# Patient Record
Sex: MALE | Race: OTHER | HISPANIC OR LATINO | URBAN - METROPOLITAN AREA
[De-identification: names, ages, dates, MRNs, and addresses within clinical notes are randomized per-mention and may not be internally consistent; named-entity substitution may affect disease eponyms.]

---

## 2019-01-06 ENCOUNTER — EMERGENCY (EMERGENCY)
Facility: HOSPITAL | Age: 24
LOS: 0 days | Discharge: HOME | End: 2019-01-06
Attending: EMERGENCY MEDICINE | Admitting: EMERGENCY MEDICINE
Payer: MEDICAID

## 2019-01-06 VITALS
RESPIRATION RATE: 18 BRPM | TEMPERATURE: 99 F | HEART RATE: 69 BPM | DIASTOLIC BLOOD PRESSURE: 86 MMHG | SYSTOLIC BLOOD PRESSURE: 118 MMHG | OXYGEN SATURATION: 96 %

## 2019-01-06 VITALS — TEMPERATURE: 98 F

## 2019-01-06 DIAGNOSIS — Z79.51 LONG TERM (CURRENT) USE OF INHALED STEROIDS: ICD-10-CM

## 2019-01-06 DIAGNOSIS — R05 COUGH: ICD-10-CM

## 2019-01-06 DIAGNOSIS — J45.909 UNSPECIFIED ASTHMA, UNCOMPLICATED: ICD-10-CM

## 2019-01-06 DIAGNOSIS — Z11.2 ENCOUNTER FOR SCREENING FOR OTHER BACTERIAL DISEASES: ICD-10-CM

## 2019-01-06 DIAGNOSIS — B34.9 VIRAL INFECTION, UNSPECIFIED: ICD-10-CM

## 2019-01-06 LAB — S PYO AG SPEC QL IA: NEGATIVE — SIGNIFICANT CHANGE UP

## 2019-01-06 PROCEDURE — 71046 X-RAY EXAM CHEST 2 VIEWS: CPT | Mod: 26

## 2019-01-06 PROCEDURE — 99053 MED SERV 10PM-8AM 24 HR FAC: CPT

## 2019-01-06 PROCEDURE — 99283 EMERGENCY DEPT VISIT LOW MDM: CPT | Mod: 25

## 2019-01-06 RX ORDER — IPRATROPIUM/ALBUTEROL SULFATE 18-103MCG
3 AEROSOL WITH ADAPTER (GRAM) INHALATION ONCE
Qty: 0 | Refills: 0 | Status: COMPLETED | OUTPATIENT
Start: 2019-01-06 | End: 2019-01-06

## 2019-01-06 RX ORDER — IBUPROFEN 200 MG
600 TABLET ORAL ONCE
Qty: 0 | Refills: 0 | Status: COMPLETED | OUTPATIENT
Start: 2019-01-06 | End: 2019-01-06

## 2019-01-06 RX ORDER — ALBUTEROL 90 UG/1
2 AEROSOL, METERED ORAL
Qty: 1 | Refills: 0 | OUTPATIENT
Start: 2019-01-06

## 2019-01-06 RX ADMIN — Medication 600 MILLIGRAM(S): at 02:06

## 2019-01-06 RX ADMIN — Medication 3 MILLILITER(S): at 02:06

## 2019-01-06 RX ADMIN — Medication 600 MILLIGRAM(S): at 03:17

## 2019-01-06 NOTE — ED PROVIDER NOTE - ENMT, MLM
Airway patent, Nasal mucosa clear. Mouth with normal mucosa. Throat has no vesicles, no oropharyngeal exudates and uvula is midline.  Mild erythema

## 2019-01-06 NOTE — ED PROVIDER NOTE - MEDICAL DECISION MAKING DETAILS
likely viral syndrome, probably flu, symptoms now for 5 days out of window for tamiflu.  plan check rapid strep, chest xray and give neb treatment.

## 2019-01-06 NOTE — ED PROVIDER NOTE - OBJECTIVE STATEMENT
int Chai [id number]    24 yo male with 5 days of subjective fever, cough, sore throat, body aches, headache.  Did not take his temperature but has had sweats and chills.  Took advil 5 hours pta.  Went to the dr today and was given cough medication, which he states isn't helping.  He has a h/o asthma and had medication in [facility], but doesn't have anything in [place].

## 2019-01-06 NOTE — ED ADULT NURSE REASSESSMENT NOTE - NS ED NURSE REASSESS COMMENT FT1
assumed pt care at 0215. Pt remains aox3, strep swabs sent to lab, pt in NAD. Family member at bedside. Duoneb in progress.

## 2019-01-06 NOTE — ED PROVIDER NOTE - NSFOLLOWUPINSTRUCTIONS_ED_ALL_ED_FT
Follow up at [facility] Monday  Tylenol 1 gram every 6 hours for fever  alternate with   Advil 600mg every 6 hour for fever  Drink plenty of fluids  Use albuterol inhaler 2 puffs every 6 hours  Hycodan 10ml at bedtime for cough  Return to Ed for any worsening symptoms

## 2023-03-30 ENCOUNTER — EMERGENCY (EMERGENCY)
Facility: HOSPITAL | Age: 28
LOS: 0 days | Discharge: HOME | End: 2023-03-31
Attending: EMERGENCY MEDICINE
Payer: COMMERCIAL

## 2023-03-30 VITALS
WEIGHT: 179.9 LBS | HEIGHT: 63 IN | SYSTOLIC BLOOD PRESSURE: 122 MMHG | DIASTOLIC BLOOD PRESSURE: 77 MMHG | OXYGEN SATURATION: 100 % | RESPIRATION RATE: 18 BRPM | TEMPERATURE: 97 F | HEART RATE: 75 BPM

## 2023-03-30 DIAGNOSIS — S39.011A STRAIN OF MUSCLE, FASCIA AND TENDON OF ABDOMEN, INITIAL ENCOUNTER: ICD-10-CM
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
## 2023-03-30 DIAGNOSIS — R10.32 LEFT LOWER QUADRANT PAIN: ICD-10-CM
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
## 2023-03-30 DIAGNOSIS — N50.3 CYST OF EPIDIDYMIS: ICD-10-CM

## 2023-03-30 PROCEDURE — 99285 EMERGENCY DEPT VISIT HI MDM: CPT

## 2023-03-30 PROCEDURE — 74176 CT ABD & PELVIS W/O CONTRAST: CPT | Mod: MA

## 2023-03-30 PROCEDURE — 99284 EMERGENCY DEPT VISIT MOD MDM: CPT | Mod: 25

## 2023-03-30 PROCEDURE — 76870 US EXAM SCROTUM: CPT

## 2023-03-30 PROCEDURE — 96374 THER/PROPH/DIAG INJ IV PUSH: CPT

## 2023-03-30 PROCEDURE — 81001 URINALYSIS AUTO W/SCOPE: CPT

## 2023-03-30 PROCEDURE — 87086 URINE CULTURE/COLONY COUNT: CPT

## 2023-03-30 PROCEDURE — 74176 CT ABD & PELVIS W/O CONTRAST: CPT | Mod: 26,MA

## 2023-03-30 PROCEDURE — 36415 COLL VENOUS BLD VENIPUNCTURE: CPT

## 2023-03-30 PROCEDURE — 85025 COMPLETE CBC W/AUTO DIFF WBC: CPT

## 2023-03-30 PROCEDURE — 80053 COMPREHEN METABOLIC PANEL: CPT

## 2023-03-30 RX ORDER — KETOROLAC TROMETHAMINE 30 MG/ML
30 SYRINGE (ML) INJECTION ONCE
Refills: 0 | Status: DISCONTINUED | OUTPATIENT
Start: 2023-03-30 | End: 2023-03-30

## 2023-03-30 RX ADMIN — Medication 30 MILLIGRAM(S): at 23:57

## 2023-03-30 NOTE — ED ADULT TRIAGE NOTE - CHIEF COMPLAINT QUOTE
LLQ abdominal pain radiating to left groin since this morning with left testicular discomfort and difficulty urinating

## 2023-03-31 VITALS
HEART RATE: 77 BPM | SYSTOLIC BLOOD PRESSURE: 117 MMHG | DIASTOLIC BLOOD PRESSURE: 80 MMHG | OXYGEN SATURATION: 100 % | TEMPERATURE: 98 F | RESPIRATION RATE: 17 BRPM

## 2023-03-31 PROBLEM — J45.909 UNSPECIFIED ASTHMA, UNCOMPLICATED: Chronic | Status: ACTIVE | Noted: 2019-01-06

## 2023-03-31 LAB
ALBUMIN SERPL ELPH-MCNC: 4.1 G/DL — SIGNIFICANT CHANGE UP (ref 3.3–5)
ALP SERPL-CCNC: 76 U/L — SIGNIFICANT CHANGE UP (ref 40–120)
ALT FLD-CCNC: 42 U/L — SIGNIFICANT CHANGE UP (ref 12–78)
ANION GAP SERPL CALC-SCNC: 2 MMOL/L — LOW (ref 5–17)
APPEARANCE UR: CLEAR — SIGNIFICANT CHANGE UP
AST SERPL-CCNC: 23 U/L — SIGNIFICANT CHANGE UP (ref 15–37)
BACTERIA # UR AUTO: NEGATIVE — SIGNIFICANT CHANGE UP
BASOPHILS # BLD AUTO: 0.04 K/UL — SIGNIFICANT CHANGE UP (ref 0–0.2)
BASOPHILS NFR BLD AUTO: 0.5 % — SIGNIFICANT CHANGE UP (ref 0–2)
BILIRUB SERPL-MCNC: 0.6 MG/DL — SIGNIFICANT CHANGE UP (ref 0.2–1.2)
BILIRUB UR-MCNC: NEGATIVE — SIGNIFICANT CHANGE UP
BUN SERPL-MCNC: 12 MG/DL — SIGNIFICANT CHANGE UP (ref 7–23)
CALCIUM SERPL-MCNC: 8.7 MG/DL — SIGNIFICANT CHANGE UP (ref 8.5–10.1)
CHLORIDE SERPL-SCNC: 111 MMOL/L — HIGH (ref 96–108)
CO2 SERPL-SCNC: 28 MMOL/L — SIGNIFICANT CHANGE UP (ref 22–31)
COLOR SPEC: YELLOW — SIGNIFICANT CHANGE UP
CREAT SERPL-MCNC: 0.88 MG/DL — SIGNIFICANT CHANGE UP (ref 0.5–1.3)
DIFF PNL FLD: NEGATIVE — SIGNIFICANT CHANGE UP
EGFR: 119 ML/MIN/1.73M2 — SIGNIFICANT CHANGE UP
EOSINOPHIL # BLD AUTO: 0.18 K/UL — SIGNIFICANT CHANGE UP (ref 0–0.5)
EOSINOPHIL NFR BLD AUTO: 2.2 % — SIGNIFICANT CHANGE UP (ref 0–6)
EPI CELLS # UR: NEGATIVE — SIGNIFICANT CHANGE UP
GLUCOSE SERPL-MCNC: 124 MG/DL — HIGH (ref 70–99)
GLUCOSE UR QL: NEGATIVE — SIGNIFICANT CHANGE UP
HCT VFR BLD CALC: 41.5 % — SIGNIFICANT CHANGE UP (ref 39–50)
HGB BLD-MCNC: 14.4 G/DL — SIGNIFICANT CHANGE UP (ref 13–17)
IMM GRANULOCYTES NFR BLD AUTO: 0.5 % — SIGNIFICANT CHANGE UP (ref 0–0.9)
KETONES UR-MCNC: ABNORMAL
LEUKOCYTE ESTERASE UR-ACNC: ABNORMAL
LYMPHOCYTES # BLD AUTO: 2.53 K/UL — SIGNIFICANT CHANGE UP (ref 1–3.3)
LYMPHOCYTES # BLD AUTO: 31.1 % — SIGNIFICANT CHANGE UP (ref 13–44)
MCHC RBC-ENTMCNC: 29.4 PG — SIGNIFICANT CHANGE UP (ref 27–34)
MCHC RBC-ENTMCNC: 34.7 GM/DL — SIGNIFICANT CHANGE UP (ref 32–36)
MCV RBC AUTO: 84.9 FL — SIGNIFICANT CHANGE UP (ref 80–100)
MONOCYTES # BLD AUTO: 0.49 K/UL — SIGNIFICANT CHANGE UP (ref 0–0.9)
MONOCYTES NFR BLD AUTO: 6 % — SIGNIFICANT CHANGE UP (ref 2–14)
NEUTROPHILS # BLD AUTO: 4.85 K/UL — SIGNIFICANT CHANGE UP (ref 1.8–7.4)
NEUTROPHILS NFR BLD AUTO: 59.7 % — SIGNIFICANT CHANGE UP (ref 43–77)
NITRITE UR-MCNC: NEGATIVE — SIGNIFICANT CHANGE UP
PH UR: 5 — SIGNIFICANT CHANGE UP (ref 5–8)
PLATELET # BLD AUTO: 237 K/UL — SIGNIFICANT CHANGE UP (ref 150–400)
POTASSIUM SERPL-MCNC: 3.8 MMOL/L — SIGNIFICANT CHANGE UP (ref 3.5–5.3)
POTASSIUM SERPL-SCNC: 3.8 MMOL/L — SIGNIFICANT CHANGE UP (ref 3.5–5.3)
PROT SERPL-MCNC: 7.8 GM/DL — SIGNIFICANT CHANGE UP (ref 6–8.3)
PROT UR-MCNC: NEGATIVE — SIGNIFICANT CHANGE UP
RBC # BLD: 4.89 M/UL — SIGNIFICANT CHANGE UP (ref 4.2–5.8)
RBC # FLD: 11.6 % — SIGNIFICANT CHANGE UP (ref 10.3–14.5)
RBC CASTS # UR COMP ASSIST: SIGNIFICANT CHANGE UP /HPF (ref 0–4)
SODIUM SERPL-SCNC: 141 MMOL/L — SIGNIFICANT CHANGE UP (ref 135–145)
SP GR SPEC: 1.02 — SIGNIFICANT CHANGE UP (ref 1.01–1.02)
UROBILINOGEN FLD QL: 1
WBC # BLD: 8.13 K/UL — SIGNIFICANT CHANGE UP (ref 3.8–10.5)
WBC # FLD AUTO: 8.13 K/UL — SIGNIFICANT CHANGE UP (ref 3.8–10.5)
WBC UR QL: SIGNIFICANT CHANGE UP /HPF (ref 0–5)

## 2023-03-31 PROCEDURE — 76870 US EXAM SCROTUM: CPT | Mod: 26

## 2023-03-31 NOTE — ED PROVIDER NOTE - NSFOLLOWUPINSTRUCTIONS_ED_ALL_ED_FT
Take ibuprofen for pain.  Take 600mg every 6 hours as needed.    See your primary care doctor within 1-2 weeks.

## 2023-03-31 NOTE — ED PROVIDER NOTE - NSFOLLOWUPCLINICS_GEN_ALL_ED_FT
[facility]  Family Medicine  [address] [place] [address]  Phone: [phone]  Fax:

## 2023-03-31 NOTE — ED PROVIDER NOTE - PATIENT PORTAL LINK FT
You can access the FollowMyHealth Patient Portal offered by [facility] by registering at the following website: [email or web address]. By joining [facility]’s FollowMyHealth portal, you will also be able to view your health information using other applications (apps) compatible with our system.

## 2023-03-31 NOTE — ED PROVIDER NOTE - HEME LYMPH, MLM
No adenopathy or splenomegaly. No cervical or inguinal lymphadenopathy. Yes - the patient is able to be screened

## 2023-03-31 NOTE — ED PROVIDER NOTE - OBJECTIVE STATEMENT
Pt. is a 26 yo M with PMHx of asthma presenting with abdominal pain X 1 day.  Patient describes 1 day of intermittent LLQ abdominal pain radiating into groin and testicle.  Denies dysuria or penile discharge.  No hematuria.  No hx of kidney stones.  Denies fever, cough, chest pain, vomiting or diarrhea.  Noticed some urinary hesitancy in the past few hours but able to urinate.

## 2023-04-01 LAB
CULTURE RESULTS: SIGNIFICANT CHANGE UP
SPECIMEN SOURCE: SIGNIFICANT CHANGE UP

## 2023-04-23 ENCOUNTER — EMERGENCY (EMERGENCY)
Facility: HOSPITAL | Age: 28
LOS: 0 days | Discharge: HOME | End: 2023-04-23
Attending: STUDENT IN AN ORGANIZED HEALTH CARE EDUCATION/TRAINING PROGRAM
Payer: COMMERCIAL

## 2023-04-23 VITALS
HEART RATE: 59 BPM | OXYGEN SATURATION: 99 % | DIASTOLIC BLOOD PRESSURE: 69 MMHG | SYSTOLIC BLOOD PRESSURE: 118 MMHG | RESPIRATION RATE: 17 BRPM | TEMPERATURE: 98 F

## 2023-04-23 VITALS
HEART RATE: 60 BPM | WEIGHT: 169.98 LBS | HEIGHT: 67 IN | SYSTOLIC BLOOD PRESSURE: 138 MMHG | DIASTOLIC BLOOD PRESSURE: 66 MMHG | RESPIRATION RATE: 18 BRPM | OXYGEN SATURATION: 98 % | TEMPERATURE: 98 F

## 2023-04-23 DIAGNOSIS — R55 SYNCOPE AND COLLAPSE: ICD-10-CM

## 2023-04-23 DIAGNOSIS — R22.2 LOCALIZED SWELLING, MASS AND LUMP, TRUNK: ICD-10-CM

## 2023-04-23 DIAGNOSIS — Z91.040 LATEX ALLERGY STATUS: ICD-10-CM

## 2023-04-23 DIAGNOSIS — J45.909 UNSPECIFIED ASTHMA, UNCOMPLICATED: ICD-10-CM

## 2023-04-23 DIAGNOSIS — R06.02 SHORTNESS OF BREATH: ICD-10-CM

## 2023-04-23 DIAGNOSIS — R42 DIZZINESS AND GIDDINESS: ICD-10-CM

## 2023-04-23 LAB
ALBUMIN SERPL ELPH-MCNC: 3.7 G/DL — SIGNIFICANT CHANGE UP (ref 3.3–5)
ALP SERPL-CCNC: 68 U/L — SIGNIFICANT CHANGE UP (ref 40–120)
ALT FLD-CCNC: 27 U/L — SIGNIFICANT CHANGE UP (ref 12–78)
ANION GAP SERPL CALC-SCNC: 2 MMOL/L — LOW (ref 5–17)
AST SERPL-CCNC: 13 U/L — LOW (ref 15–37)
BASOPHILS # BLD AUTO: 0.02 K/UL — SIGNIFICANT CHANGE UP (ref 0–0.2)
BASOPHILS NFR BLD AUTO: 0.2 % — SIGNIFICANT CHANGE UP (ref 0–2)
BILIRUB SERPL-MCNC: 1.5 MG/DL — HIGH (ref 0.2–1.2)
BUN SERPL-MCNC: 14 MG/DL — SIGNIFICANT CHANGE UP (ref 7–23)
CALCIUM SERPL-MCNC: 9 MG/DL — SIGNIFICANT CHANGE UP (ref 8.5–10.1)
CHLORIDE SERPL-SCNC: 107 MMOL/L — SIGNIFICANT CHANGE UP (ref 96–108)
CO2 SERPL-SCNC: 29 MMOL/L — SIGNIFICANT CHANGE UP (ref 22–31)
CREAT SERPL-MCNC: 0.98 MG/DL — SIGNIFICANT CHANGE UP (ref 0.5–1.3)
D DIMER BLD IA.RAPID-MCNC: <150 NG/ML DDU — SIGNIFICANT CHANGE UP
EGFR: 107 ML/MIN/1.73M2 — SIGNIFICANT CHANGE UP
EOSINOPHIL # BLD AUTO: 0.08 K/UL — SIGNIFICANT CHANGE UP (ref 0–0.5)
EOSINOPHIL NFR BLD AUTO: 0.8 % — SIGNIFICANT CHANGE UP (ref 0–6)
GLUCOSE SERPL-MCNC: 145 MG/DL — HIGH (ref 70–99)
HCT VFR BLD CALC: 40.9 % — SIGNIFICANT CHANGE UP (ref 39–50)
HGB BLD-MCNC: 13.8 G/DL — SIGNIFICANT CHANGE UP (ref 13–17)
IMM GRANULOCYTES NFR BLD AUTO: 0.3 % — SIGNIFICANT CHANGE UP (ref 0–0.9)
LIDOCAIN IGE QN: 117 U/L — SIGNIFICANT CHANGE UP (ref 73–393)
LYMPHOCYTES # BLD AUTO: 3.1 K/UL — SIGNIFICANT CHANGE UP (ref 1–3.3)
LYMPHOCYTES # BLD AUTO: 32.6 % — SIGNIFICANT CHANGE UP (ref 13–44)
MAGNESIUM SERPL-MCNC: 1.9 MG/DL — SIGNIFICANT CHANGE UP (ref 1.6–2.6)
MCHC RBC-ENTMCNC: 29.3 PG — SIGNIFICANT CHANGE UP (ref 27–34)
MCHC RBC-ENTMCNC: 33.7 GM/DL — SIGNIFICANT CHANGE UP (ref 32–36)
MCV RBC AUTO: 86.8 FL — SIGNIFICANT CHANGE UP (ref 80–100)
MONOCYTES # BLD AUTO: 0.65 K/UL — SIGNIFICANT CHANGE UP (ref 0–0.9)
MONOCYTES NFR BLD AUTO: 6.8 % — SIGNIFICANT CHANGE UP (ref 2–14)
NEUTROPHILS # BLD AUTO: 5.63 K/UL — SIGNIFICANT CHANGE UP (ref 1.8–7.4)
NEUTROPHILS NFR BLD AUTO: 59.3 % — SIGNIFICANT CHANGE UP (ref 43–77)
NT-PROBNP SERPL-SCNC: 83 PG/ML — SIGNIFICANT CHANGE UP (ref 0–125)
PLATELET # BLD AUTO: 249 K/UL — SIGNIFICANT CHANGE UP (ref 150–400)
POTASSIUM SERPL-MCNC: 3.8 MMOL/L — SIGNIFICANT CHANGE UP (ref 3.5–5.3)
POTASSIUM SERPL-SCNC: 3.8 MMOL/L — SIGNIFICANT CHANGE UP (ref 3.5–5.3)
PROT SERPL-MCNC: 7.5 GM/DL — SIGNIFICANT CHANGE UP (ref 6–8.3)
RBC # BLD: 4.71 M/UL — SIGNIFICANT CHANGE UP (ref 4.2–5.8)
RBC # FLD: 11.5 % — SIGNIFICANT CHANGE UP (ref 10.3–14.5)
SODIUM SERPL-SCNC: 138 MMOL/L — SIGNIFICANT CHANGE UP (ref 135–145)
TROPONIN I, HIGH SENSITIVITY RESULT: 3.35 NG/L — SIGNIFICANT CHANGE UP
WBC # BLD: 9.51 K/UL — SIGNIFICANT CHANGE UP (ref 3.8–10.5)
WBC # FLD AUTO: 9.51 K/UL — SIGNIFICANT CHANGE UP (ref 3.8–10.5)

## 2023-04-23 PROCEDURE — 85379 FIBRIN DEGRADATION QUANT: CPT

## 2023-04-23 PROCEDURE — 36415 COLL VENOUS BLD VENIPUNCTURE: CPT

## 2023-04-23 PROCEDURE — 71045 X-RAY EXAM CHEST 1 VIEW: CPT | Mod: 26

## 2023-04-23 PROCEDURE — 83880 ASSAY OF NATRIURETIC PEPTIDE: CPT

## 2023-04-23 PROCEDURE — 93005 ELECTROCARDIOGRAM TRACING: CPT

## 2023-04-23 PROCEDURE — 93010 ELECTROCARDIOGRAM REPORT: CPT

## 2023-04-23 PROCEDURE — 84484 ASSAY OF TROPONIN QUANT: CPT

## 2023-04-23 PROCEDURE — 82962 GLUCOSE BLOOD TEST: CPT

## 2023-04-23 PROCEDURE — 80053 COMPREHEN METABOLIC PANEL: CPT

## 2023-04-23 PROCEDURE — 99285 EMERGENCY DEPT VISIT HI MDM: CPT

## 2023-04-23 PROCEDURE — 71045 X-RAY EXAM CHEST 1 VIEW: CPT

## 2023-04-23 PROCEDURE — 83690 ASSAY OF LIPASE: CPT

## 2023-04-23 PROCEDURE — 83735 ASSAY OF MAGNESIUM: CPT

## 2023-04-23 PROCEDURE — 85025 COMPLETE CBC W/AUTO DIFF WBC: CPT

## 2023-04-23 PROCEDURE — 99285 EMERGENCY DEPT VISIT HI MDM: CPT | Mod: 25

## 2023-04-23 RX ORDER — SODIUM CHLORIDE 9 MG/ML
2000 INJECTION INTRAMUSCULAR; INTRAVENOUS; SUBCUTANEOUS ONCE
Refills: 0 | Status: COMPLETED | OUTPATIENT
Start: 2023-04-23 | End: 2023-04-23

## 2023-04-23 RX ADMIN — SODIUM CHLORIDE 2000 MILLILITER(S): 9 INJECTION INTRAMUSCULAR; INTRAVENOUS; SUBCUTANEOUS at 22:11

## 2023-04-23 NOTE — ED PROVIDER NOTE - OBJECTIVE STATEMENT
28 y/o male with PMHx of asthma presents to the ED s/p near-syncopal episode. Pt had surgery for his hernia near left groin on Friday 4/21. Today pt was sitting and talking with family when he suddenly felt dizzy and lightheaded and became pale and clammy. Aunt states that pt had 2 minute period of confusion as well. No LOC. Pt states that he currently has inflammation at surgical site. Denies CP, difficulty breathing, and calf pain. Slight SOB. Pt has not had a BM since Thursday but is still able to pass gas.  Currently taking percocet ever 6 hours. Nonsmoker, no EtOH.  Allergic to latex.

## 2023-04-23 NOTE — ED ADULT NURSE REASSESSMENT NOTE - NS ED NURSE REASSESS COMMENT FT1
Order obtained from Dr. Friedman for 2,000 ml IV fluids 0.9% normal saline over one hour. Order implemented.
Pt care assumed from previous RN, Thor. Pt is resting in stretcher at this time w/ no complaints. Family is at bedside and aware of POC to await test results.

## 2023-04-23 NOTE — ED ADULT TRIAGE NOTE - TEMPERATURE IN FAHRENHEIT (DEGREES F)
Patient states that her insurance will not pay for the vials? What are her other options? Please advise and send a new prescription to [facility]. Thanks! 97.6

## 2023-04-23 NOTE — ED PROVIDER NOTE - GASTROINTESTINAL, MLM
Abdomen soft, non-tender, no guarding. left inguinal surgical site clean dry intact no signs of infection no erythema no palpable abscess, minimal ttp

## 2023-04-23 NOTE — ED ADULT TRIAGE NOTE - CHIEF COMPLAINT QUOTE
Pt presents to ED BIBA for near syncope today while walking around at home. Pt had hernia surgery 2 days ago, pt has been resting on the couch all day, his family encouraged him to walk around inside the house, he got up and walked for a few minutes, became dizzy and sweaty so his family called 911, pt did not LOC.  Pt denies nausea/vomiting, chest pain, SOB. Pt's family states that he has not been eating since the surgery. , EKG in process.

## 2023-04-23 NOTE — ED PROVIDER NOTE - CLINICAL SUMMARY MEDICAL DECISION MAKING FREE TEXT BOX
Adult male post op day 2 from left inguinal hernia repair comes in with near syncopal episode. Vitals are normal. Pt is fatigued, tired c/o pain at incision site. Still passing gas from below. Abdominal exam benign. Plan is for blood work, D-dimer, IV fluids. EKG looks normal. If blood work is negative, can be discharged with outpatient followup. Near-syncope can be due to dehydration.

## 2023-04-23 NOTE — ED ADULT NURSE NOTE - OBJECTIVE STATEMENT
Pt is A&O x 3, appears cool and clammy, states he had a near syncope episode, feels dizzy and nausea, Pt states he had a hernia repair a couple of days ago. Post surgical incision left groin linear In appears approx. 6 cm. PT states having mild pain at the site. PT has been taking percocet for pain. PT states not having a bowel movement since before the surgery and has not been eating or drinking a lot post op. Family at bedside (Aunt and cousin) states patient had a period of confusion lasting over a couple of minutes. PT placed on [employer] tele monitor.

## 2023-04-23 NOTE — ED PROVIDER NOTE - NSFOLLOWUPINSTRUCTIONS_ED_ALL_ED_FT
Seek immediate medical assistance for any new or worsening symptoms. If you have issues obtaining follow up, please call: [phone] or [phone]  to obtain a doctor or specialist who takes your insurance in your area.     Follow up with your general surgeon.     [facility]  Cardiology  [address] [place] [address]  Phone: [phone]  Fax:   Follow Up Time:     Near Syncope    WHAT YOU NEED TO KNOW:    Near syncope, also called presyncope, is the feeling that you may faint (lose consciousness), but you do not. You can control some health conditions that cause near syncope. Your healthcare providers can help you create a plan to manage near syncope and prevent episodes.    DISCHARGE INSTRUCTIONS:    Seek care immediately if:    You have sudden chest pain.    You have trouble breathing or shortness of breath.    You have vision changes, are sweating, and have nausea while you are sitting or lying down.    You feel dizzy or flushed and your heart is fluttering.    You lose consciousness.    You cannot use your arm, hand, foot, or leg, or it feels weak.    You have trouble speaking or understanding others when they speak.  Contact your healthcare provider if:    You have new or worsening symptoms.    Your heart beats faster or slower than usual.    You have questions or concerns about your condition or care.  Medicines:    Medicines may be needed to help your heart pump strongly and regularly. Your healthcare provider may also make changes to any medicines that are causing syncope.    Take your medicine as directed. Contact your healthcare provider if you think your medicine is not helping or if you have side effects. Tell him or her if you are allergic to any medicine. Keep a list of the medicines, vitamins, and herbs you take. Include the amounts, and when and why you take them. Bring the list or the pill bottles to follow-up visits. Carry your medicine list with you in case of an emergency.  Follow up with your healthcare provider as directed: You may need more tests to help find the cause of your near syncope episodes. The tests will help healthcare providers plan the best treatment for you. Write down your questions so you remember to ask them during your visits.    Manage near syncope:    Sit or lie down when needed. This includes when you feel dizzy, your throat is getting tight, and your vision changes. Raise your legs above the level of your heart.    Take slow, deep breaths if you start to breathe faster with anxiety or fear. This can help decrease dizziness and the feeling that you might faint.    Keep a record of your near syncope episodes. Include your symptoms and your activity before and after the episode. The record can help your healthcare provider find the cause of your near syncope and help you manage episodes.  Prevent a near syncope episode:    Move slowly and let yourself get used to one position before you move to another position. This is very important when you change from a lying or sitting position to a standing position. Take some deep breaths before you stand up from a lying position. Stand up slowly. Sudden movements may cause a fainting spell. Sit on the side of the bed or couch for a few minutes before you stand up. If you are on bedrest, try to be upright for about 2 hours each day, or as directed. Do not lock your legs if you are standing for a long period of time. Move your legs and bend your knees to keep blood flowing.    Follow your healthcare provider's recommendations. Your provider may recommend that you drink more liquids to prevent dehydration. You may also need to have more salt to keep your blood pressure from dropping too low and causing syncope. Your provider will tell you how much liquid and sodium to have each day. He or she will also tell you how much physical activity is safe for you. This will depend on what is causing your near syncope.    Watch for signs of low blood sugar. These include hunger, nervousness, sweating, and fast or fluttery heartbeats. Talk with your healthcare provider about ways to keep your blood sugar level steady.    Check your blood pressure often. This is important if you take medicine to lower your blood pressure. Check your blood pressure when you are lying down and when you are standing. Ask how often to check during the day. Keep a record of your blood pressure numbers. Your healthcare provider may use the record to help plan your treatment.  How to take a Blood Pressure      Do not strain if you are constipated. You may faint if you strain to have a bowel movement. Walking is the best way to get your bowels moving. Eat foods high in fiber to make it easier to have a bowel movement. Good examples are high-fiber cereals, beans, vegetables, and whole-grain breads. Prune juice may help make bowel movements softer.    Presíncope    LO QUE NECESITA SABER:    Un presíncope es la sensación de que usted se puede desmayar (perder el conocimiento), natalie no lo hace. Usted puede controlar ciertas afecciones médicas que provocan presíncope. Adelaida médicos pueden ayudarlo a crear un plan para controlar el presíncope y evitar la ocurrencia de episodios.    INSTRUCCIONES SOBRE EL EROS HOSPITALARIA:    Busque atención médica de inmediato si:    Tiene dolor repentino en el pecho.    Usted tiene dificultad para respirar o le falta el aire.    Usted tiene cambios en la visión, está sudando y tiene náuseas mientras está sentado o acostado.    Usted se siente mareado o acalorado y worley corazón le está aleteando telma si se le fuera a salir.    Usted pierde el conocimiento.    Usted no puede controlar un brazo, corby mano, un pie o corby pierna, o los siente débiles.    Tiene problemas para hablar o entender a otros cuando hablan.  Comuníquese con worley médico si:    Usted tiene nuevos síntomas o adelaida síntomas empeoran.    Worley corazón late más rápido o más despacio que lo acostumbrado.    Usted tiene preguntas o inquietudes acerca de worley condición o cuidado.  Medicamentos:    Los medicamentospodrían administrarse para ayudar a que worley corazón flavia con fuerza y regularidad. Worley médico podría hacer cambios a cualquier medicamento que le esté causando síncope.    [place] adelaida medicamentos telma se le haya indicado.Consulte con worley médico si usted sarwat que worley medicamento no le está ayudando o si presenta efectos secundarios. Infórmele si es alérgico a cualquier medicamento. Mantenga corby lista actualizada de los medicamentos, las vitaminas y los productos herbales que angelia. Incluya los siguientes datos de los medicamentos: cantidad, frecuencia y motivo de administración. Traiga con usted la lista o los envases de las píldoras a adelaida citas de seguimiento. Lleve la lista de los medicamentos con usted en chandrika de corby emergencia.  Acuda a adelaida consultas de control con worley médico según le indicaron.Usted podría necesitar más exámenes para averiguar la causa de adelaida episodios de presíncope. Los exámenes ayudarán a worley médico a planificar el mejor tratamiento para usted. [place] adelaida preguntas para que se acuerde de hacerlas luis fernando adelaida visitas.    Control del presíncope:    Siéntese o acuéstese cuando sea necesario.[place] incluye cuando se sienta mareado, worley garganta se cierre o note cambios en worley visión. Eleve adelaida piernas por encima del nivel de worley corazón.    Inhale lenta y profundamente si comienza a respirar más rápido a causa de la ansiedad o el temor.[place] puede disminuir el mareo y la sensación de que se va a desmayar.    Mantenga un registro de los episodios de presíncope.Incluya los síntomas y la actividad que realiza antes y después del episodio. El registro puede ayudar a worley médico a determinar la causa de worley presíncope y ayudarlo a controlar los episodios.  Prevención de un episodio de presíncope:    Muévase lentamente y acostúmbrese a corby posición antes de moverse a otra.[place] es muy importante cuando usted se cambie de corby posición acostada o sentada a corby posición de pie. Respire profundo varias veces antes de ponerse de pie después de vic estado acostado. Póngase de pie lentamente. Los movimientos repentinos podrían causar desmayos. Siéntese en el borde de la cama o del sofá por unos minutos antes de ponerse de pie. En chandrika que esté guardando cama, debe tratar de estar en corby posición vertical por lo menos por 2 horas todos los días o telma se lo indicaron. No inmovilice las piernas cuando esté de pie luis fernando un tito periodo de tiempo. Mueva las piernas y doble las rodillas para mantener el flujo de monica.    Siga las recomendaciones de worley médico.El médico puede recomendarle que ingiera más líquidos para evitar la deshidratación. Es probable que usted también deba aumentar worley consumo de sal para evitar que worley presión arterial baje demasiado y ocurra un síncope. El médico le dirá cuánto líquido y sodio debe consumir cada día. También le dirá cuánta actividad física es ackerman para usted. [place] dependerá de la causa de worley presíncope.    Esté atento a los signos de bajo nivel de azúcar en la monica.Los signos incluyen hambre, nerviosismo, sudoración y latidos cardíacos rápidos o palpitantes. Consulte con worley médico sobre métodos para mantener estable worley nivel de azúcar en la monica.    Revise worley presión arterial con frecuencia.[place] es importante si usted angelia medicamentos para bajar la presión arterial. Revise worley presión arterial cuando esté acostado y cuando esté de pie. Pregunte con que frecuencia debe tomarse la presión luis fernando el día. Mantenga un registro de los valores numéricos de worley presión arterial. Worley médico puede usar la información del registro telma corby base para planificar worley tratamiento.  Cómo noah la presión arterial      No se esfuerce si está estreñido.Puede desmayarse si usted hace fuerza para realizar corby evacuación intestinal. Caminar es lo mejor que usted puede hacer para que adelaida intestinos se muevan. Consuma alimentos ricos en fibra para facilitar las evacuaciones intestinales. Los cereales altos en fibra, los frijoles, las verduras y los panes integrales son buenos ejemplos. El jugo de ciruelas pasas también puede suavizar las evacuaciones intestinales.    No oneil ejercicio al aire angel luis en un día de calor.La combinación de calor y actividad física puede llevar a la deshidratación. [place] podría causar un síncope.    Do not exercise outside on a hot day. The combination of physical activity and heat can lead to dehydration. This can cause syncope.

## 2023-04-23 NOTE — ED PROVIDER NOTE - PATIENT PORTAL LINK FT
You can access the FollowMyHealth Patient Portal offered by [facility] by registering at the following website: [email or web address]. By joining [facility]’s FollowMyHealth portal, you will also be able to view your health information using other applications (apps) compatible with our system.

## 2023-06-14 NOTE — ED PROVIDER NOTE - WET READ LAUNCH FT
Subjective   Patient ID: Jaylen Jung is a 23 y.o. male.    HPI  Right hand ganglion cyst, tried to hit with bible and it went awy and then came back and pain is worse. Right hand dominant.   Review of Systems   Constitutional:  Positive for unexpected weight change (40 pound weight loss).     Wrist cyst,   Objective   Physical Exam  Vitals reviewed.   Constitutional:       Appearance: Normal appearance.   HENT:      Head: Normocephalic and atraumatic.      Right Ear: Tympanic membrane normal.      Left Ear: Tympanic membrane normal.      Nose: Nose normal.      Mouth/Throat:      Mouth: Mucous membranes are moist.      Pharynx: Oropharynx is clear.   Eyes:      Extraocular Movements: Extraocular movements intact.      Conjunctiva/sclera: Conjunctivae normal.      Pupils: Pupils are equal, round, and reactive to light.   Cardiovascular:      Rate and Rhythm: Normal rate and regular rhythm.      Pulses: Normal pulses.      Heart sounds: Normal heart sounds.   Pulmonary:      Effort: Pulmonary effort is normal.      Breath sounds: Normal breath sounds.   Abdominal:      General: Abdomen is flat. Bowel sounds are normal.      Palpations: Abdomen is soft.   Musculoskeletal:         General: Swelling and deformity present. Normal range of motion.      Cervical back: Normal range of motion and neck supple.      Comments: Ganglion cyst present right wrist.      Skin:     General: Skin is warm and dry.      Capillary Refill: Capillary refill takes less than 2 seconds.   Neurological:      General: No focal deficit present.      Mental Status: He is alert and oriented to person, place, and time.   Psychiatric:         Mood and Affect: Mood normal.         Behavior: Behavior normal.         Assessment/Plan   Diagnoses and all orders for this visit:  Ganglion cyst of dorsum of right wrist  -     Referral to Orthopaedic Surgery; Future    Most likely will involve injection aspiration and possible surgery.    There are no Wet Read(s) to document.